# Patient Record
Sex: MALE | Race: WHITE | Employment: FULL TIME | URBAN - NONMETROPOLITAN AREA
[De-identification: names, ages, dates, MRNs, and addresses within clinical notes are randomized per-mention and may not be internally consistent; named-entity substitution may affect disease eponyms.]

---

## 2020-02-20 ENCOUNTER — HOSPITAL ENCOUNTER (EMERGENCY)
Age: 25
Discharge: HOME | End: 2020-02-21
Attending: EMERGENCY MEDICINE
Payer: COMMERCIAL

## 2020-02-20 VITALS
HEART RATE: 83 BPM | SYSTOLIC BLOOD PRESSURE: 146 MMHG | DIASTOLIC BLOOD PRESSURE: 88 MMHG | BODY MASS INDEX: 35.78 KG/M2 | TEMPERATURE: 97.8 F | WEIGHT: 270 LBS | HEIGHT: 73 IN

## 2020-02-20 PROCEDURE — 99282 EMERGENCY DEPT VISIT SF MDM: CPT

## 2020-02-21 PROCEDURE — 6370000000 HC RX 637 (ALT 250 FOR IP): Performed by: EMERGENCY MEDICINE

## 2020-02-21 RX ORDER — NEOMYCIN SULFATE, POLYMYXIN B SULFATE, HYDROCORTISONE 3.5; 10000; 1 MG/ML; [USP'U]/ML; MG/ML
2 SOLUTION/ DROPS AURICULAR (OTIC) ONCE
Status: COMPLETED | OUTPATIENT
Start: 2020-02-21 | End: 2020-02-21

## 2020-02-21 RX ADMIN — NEOMYCIN SULFATE, POLYMYXIN B SULFATE, HYDROCORTISONE 2 DROP: 3.5; 10000; 1 SOLUTION/ DROPS AURICULAR (OTIC) at 00:40

## 2020-02-21 NOTE — ED NOTES
Pt to ED with c/o bug in right ear. Pt states he can feel it moving inside ear and back is able to be seen when checked. Pt denies any pain or other complaints, just discomfort r/t bug in ear. Pt alert, VS updated and as charted. SO at bedside. MD placed Lidocaine in ear and this RN will irrigate.      Anabel Comment, RN  02/20/20 0336

## 2020-02-21 NOTE — ED PROVIDER NOTES
Emergency Department Attending Note    Osiris Dickerson MD    Date of ED VIsit: 2/20/2020    CHIEF COMPLAINT  Foreign Body in Ear (Pt reports he feels as if there is a light in his right ear. )      HISTORY OF PRESENT ILLNESS  Cuco Lucio is a 21 y.o. male  With Vital signs of BP (!) 146/88   Pulse 83   Temp 97.8 °F (36.6 °C) (Oral)   Ht 6' 1\" (1.854 m)   Wt 270 lb (122.5 kg)   BMI 35.62 kg/m²  who presents to the ED with a complaint of bug in his ear. Patient seen and evaluated in room 7. Patient comes in complaining of sensation of bug in his right ear. He was laying down apparently and bug must of crawled into his ear and now he feels it moving in there and that is what made him come in the emerge department for evaluation. She has no other complaints. No other complaints, modifying factors or associated symptoms. Patients Past medical history reviewed and listed below  Past Medical History:   Diagnosis Date    Diabetes mellitus ([facility] [address]     Pneumonia      History reviewed. No pertinent surgical history. I have reviewed the following from the nursing documentation. History reviewed. No pertinent family history.   Social History     Socioeconomic History    Marital status: Single     Spouse name: Not on file    Number of children: Not on file    Years of education: Not on file    Highest education level: Not on file   Occupational History    Not on file   Social Needs    Financial resource strain: Not on file    Food insecurity:     Worry: Not on file     Inability: Not on file    Transportation needs:     Medical: Not on file     Non-medical: Not on file   Tobacco Use    Smoking status: Never Smoker    Smokeless tobacco: Never Used   Substance and Sexual Activity    Alcohol use: No    Drug use: No    Sexual activity: Not on file   Lifestyle    Physical activity:     Days per week: Not on file     Minutes per session: Not on file    Stress: Not on file   Relationships    Social connections:     Talks on phone: Not on file     Gets together: Not on file     Attends Religion service: Not on file     Active member of club or organization: Not on file     Attends meetings of clubs or organizations: Not on file     Relationship status: Not on file    Intimate partner violence:     Fear of current or ex partner: Not on file     Emotionally abused: Not on file     Physically abused: Not on file     Forced sexual activity: Not on file   Other Topics Concern    Not on file   Social History Narrative    Not on file     No current facility-administered medications for this encounter. Current Outpatient Medications   Medication Sig Dispense Refill    Dulaglutide (TRULICITY SC) Inject into the skin x1 week.  metFORMIN (GLUCOPHAGE) 500 MG tablet Take 500 mg by mouth 2 times daily (with meals)       No Known Allergies    REVIEW OF SYSTEMS  10 systems reviewed, pertinent positives per HPI otherwise noted to be negative     PHYSICAL EXAM  BP (!) 146/88   Pulse 83   Temp 97.8 °F (36.6 °C) (Oral)   Ht 6' 1\" (1.854 m)   Wt 270 lb (122.5 kg)   BMI 35.62 kg/m²   GENERAL APPEARANCE: Awake and alert. Cooperative. In minimal distress. HEAD: Normocephalic. Atraumatic. EYES: PERRL. EOM's grossly intact. ENT: Mucous membranes are pink and moist.  Patient on examination did have a what looked like a cockroach stuck in his right external auditory canal.  It was way up in there at about the 3 o'clock position        ED COURSE/MDM    Patient underwent multiple rounds of irrigation which just moved bug around none of it had the bug come out and this was all after the administration of 1% lidocaine. We further instilled some tetracaine to numb the ear canal for the patient which was still difficult because they the area being so sensitive. I then attempted curettes as well as Q-tips and forceps with no LOC.   The paramedic recommended maybe we try to suction it out and we did uses a metal tip

## 2020-09-20 ENCOUNTER — HOSPITAL ENCOUNTER (INPATIENT)
Age: 25
LOS: 1 days | Discharge: HOME | DRG: 881 | End: 2020-09-21
Attending: EMERGENCY MEDICINE | Admitting: PSYCHIATRY & NEUROLOGY
Payer: OTHER GOVERNMENT

## 2020-09-20 PROBLEM — F32.A DEPRESSION: Status: ACTIVE | Noted: 2020-09-20

## 2020-09-20 LAB
A/G RATIO: 1.9 (ref 1.1–2.2)
ALBUMIN SERPL-MCNC: 4.9 G/DL (ref 3.4–5)
ALP BLD-CCNC: 70 U/L (ref 40–129)
ALT SERPL-CCNC: 103 U/L (ref 10–40)
AMPHETAMINE SCREEN, URINE: NORMAL
ANION GAP SERPL CALCULATED.3IONS-SCNC: 8 MMOL/L (ref 3–16)
AST SERPL-CCNC: 49 U/L (ref 15–37)
BARBITURATE SCREEN URINE: NORMAL
BASOPHILS ABSOLUTE: 0 K/UL (ref 0–0.2)
BASOPHILS RELATIVE PERCENT: 0.4 %
BENZODIAZEPINE SCREEN, URINE: NORMAL
BILIRUB SERPL-MCNC: 1.6 MG/DL (ref 0–1)
BUN BLDV-MCNC: 11 MG/DL (ref 7–20)
CALCIUM SERPL-MCNC: 9.8 MG/DL (ref 8.3–10.6)
CANNABINOID SCREEN URINE: NORMAL
CHLORIDE BLD-SCNC: 99 MMOL/L (ref 99–110)
CO2: 28 MMOL/L (ref 21–32)
COCAINE METABOLITE SCREEN URINE: NORMAL
CREAT SERPL-MCNC: 0.8 MG/DL (ref 0.9–1.3)
EOSINOPHILS ABSOLUTE: 0.2 K/UL (ref 0–0.6)
EOSINOPHILS RELATIVE PERCENT: 1.8 %
ETHANOL: NORMAL MG/DL (ref 0–0.08)
GFR AFRICAN AMERICAN: >60
GFR NON-AFRICAN AMERICAN: >60
GLOBULIN: 2.6 G/DL
GLUCOSE BLD-MCNC: 121 MG/DL (ref 70–99)
HCT VFR BLD CALC: 47.4 % (ref 40.5–52.5)
HEMOGLOBIN: 16.3 G/DL (ref 13.5–17.5)
LYMPHOCYTES ABSOLUTE: 2.7 K/UL (ref 1–5.1)
LYMPHOCYTES RELATIVE PERCENT: 27.7 %
Lab: NORMAL
MCH RBC QN AUTO: 31.7 PG (ref 26–34)
MCHC RBC AUTO-ENTMCNC: 34.4 G/DL (ref 31–36)
MCV RBC AUTO: 92.1 FL (ref 80–100)
METHADONE SCREEN, URINE: NORMAL
MONOCYTES ABSOLUTE: 0.6 K/UL (ref 0–1.3)
MONOCYTES RELATIVE PERCENT: 6.6 %
NEUTROPHILS ABSOLUTE: 6.1 K/UL (ref 1.7–7.7)
NEUTROPHILS RELATIVE PERCENT: 63.5 %
OPIATE SCREEN URINE: NORMAL
OXYCODONE URINE: NORMAL
PDW BLD-RTO: 12.7 % (ref 12.4–15.4)
PH UA: 5
PHENCYCLIDINE SCREEN URINE: NORMAL
PLATELET # BLD: 281 K/UL (ref 135–450)
PMV BLD AUTO: 7.8 FL (ref 5–10.5)
POTASSIUM SERPL-SCNC: 4.1 MMOL/L (ref 3.5–5.1)
PROPOXYPHENE SCREEN: NORMAL
RBC # BLD: 5.14 M/UL (ref 4.2–5.9)
SARS-COV-2, NAAT: NOT DETECTED
SODIUM BLD-SCNC: 135 MMOL/L (ref 136–145)
TOTAL PROTEIN: 7.5 G/DL (ref 6.4–8.2)
WBC # BLD: 9.6 K/UL (ref 4–11)

## 2020-09-20 PROCEDURE — 80053 COMPREHEN METABOLIC PANEL: CPT

## 2020-09-20 PROCEDURE — G0480 DRUG TEST DEF 1-7 CLASSES: HCPCS

## 2020-09-20 PROCEDURE — 85025 COMPLETE CBC W/AUTO DIFF WBC: CPT

## 2020-09-20 PROCEDURE — 80307 DRUG TEST PRSMV CHEM ANLYZR: CPT

## 2020-09-20 PROCEDURE — U0002 COVID-19 LAB TEST NON-CDC: HCPCS

## 2020-09-20 PROCEDURE — 1240000000 HC EMOTIONAL WELLNESS R&B

## 2020-09-20 PROCEDURE — 99285 EMERGENCY DEPT VISIT HI MDM: CPT

## 2020-09-20 RX ORDER — ACETAMINOPHEN 325 MG/1
650 TABLET ORAL EVERY 4 HOURS PRN
Status: DISCONTINUED | OUTPATIENT
Start: 2020-09-20 | End: 2020-09-21 | Stop reason: HOSPADM

## 2020-09-20 RX ORDER — TRAZODONE HYDROCHLORIDE 50 MG/1
25 TABLET ORAL NIGHTLY PRN
Status: DISCONTINUED | OUTPATIENT
Start: 2020-09-20 | End: 2020-09-21 | Stop reason: HOSPADM

## 2020-09-20 RX ORDER — LORAZEPAM 0.5 MG/1
0.5 TABLET ORAL EVERY 6 HOURS PRN
Status: DISCONTINUED | OUTPATIENT
Start: 2020-09-20 | End: 2020-09-21 | Stop reason: HOSPADM

## 2020-09-20 RX ORDER — LORAZEPAM 2 MG/ML
1 INJECTION INTRAMUSCULAR EVERY 6 HOURS PRN
Status: DISCONTINUED | OUTPATIENT
Start: 2020-09-20 | End: 2020-09-21 | Stop reason: HOSPADM

## 2020-09-20 RX ORDER — HALOPERIDOL 5 MG/ML
2 INJECTION INTRAMUSCULAR EVERY 6 HOURS PRN
Status: DISCONTINUED | OUTPATIENT
Start: 2020-09-20 | End: 2020-09-21 | Stop reason: HOSPADM

## 2020-09-20 RX ORDER — BENZTROPINE MESYLATE 1 MG/ML
1 INJECTION INTRAMUSCULAR; INTRAVENOUS 2 TIMES DAILY PRN
Status: DISCONTINUED | OUTPATIENT
Start: 2020-09-20 | End: 2020-09-21 | Stop reason: HOSPADM

## 2020-09-20 RX ORDER — HALOPERIDOL 1 MG/1
2 TABLET ORAL EVERY 6 HOURS PRN
Status: DISCONTINUED | OUTPATIENT
Start: 2020-09-20 | End: 2020-09-21 | Stop reason: HOSPADM

## 2020-09-20 RX ORDER — MAGNESIUM HYDROXIDE/ALUMINUM HYDROXICE/SIMETHICONE 120; 1200; 1200 MG/30ML; MG/30ML; MG/30ML
30 SUSPENSION ORAL EVERY 6 HOURS PRN
Status: DISCONTINUED | OUTPATIENT
Start: 2020-09-20 | End: 2020-09-21 | Stop reason: HOSPADM

## 2020-09-20 ASSESSMENT — LIFESTYLE VARIABLES: HISTORY_ALCOHOL_USE: NO

## 2020-09-20 ASSESSMENT — SLEEP AND FATIGUE QUESTIONNAIRES
DO YOU HAVE DIFFICULTY SLEEPING: NO
DO YOU USE A SLEEP AID: NO
AVERAGE NUMBER OF SLEEP HOURS: 7

## 2020-09-20 NOTE — ED NOTES
[facility]. Patient changed out into safety clothing. Patient oriented to [facility]. Will continue to monitor patient.      Mehnaz Rodrigues RN  09/20/20 7118

## 2020-09-20 NOTE — ED NOTES
Pt sitting in chairs in milieu - sister present - no signs or symptoms of distress noted - will continue to monitor     Adolfo Singleton RN  09/20/20 1950

## 2020-09-20 NOTE — ED NOTES
Collateral Contact:  Name:Bouchra [phone]  Relation to Patient: Wife  Last Contact with Patient: today  Concerns: Reports they have been arguing a lot and reports she had plans to move out and separate. Reports today they agreed she was going to move out soon with their 3year old and her 6 yr old son. Reports they sat down and talked then she went to the store and he started texting her. Reports she ignored him until she came home and could talk. Reports she came home and he was in bedroom laying down. Reports he got angry and punched the wall and was crying. Reports she asked him if he wanted her to call someone and he said the police. Reports she asked if he preferred her to call his family. She reports he then said he was going to burn the house down. She then started to contact the police and he made statement good call the police Im going to tell them to blow my brains out. Reports she then contacted the police who arrived and brought him here. Reports he had never made suicidal comments prior to about 4 weeks ago when they started arguing. Reports he did make one statement then about not wanting to live. Reports he then agreed to go to [place] [address] for an appointment however she does not know what happened with that. Reports he wanted her to go do therapy with him however she felt he needed to work on himself. She reports she is moving out today with the 2 children. Reports she has guns however will be taking them with her. Reports he does have a knife and a crossbow.         Gena Renae RN  09/20/20 9928
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
## 2020-09-20 NOTE — ED NOTES
Level of Care Disposition: Admit       Patient was seen by ED provider and [facility] staff. The case presented to psychiatric provider on-call Dr. Emily Dunn. Based on the ED evaluation and information presented to the provider by [facility] staff it was determined that inpatient hospitalization is the least restrictive environment for the patient at this time. The patient will be admitted to the inpatient unit. Admitting provider did not order suicide precautions based on pt not having active plan. RATIONALE FOR ADMISSION:   Patient has a mental illness: depression    Patient at imminent risk of danger to self as demonstrated by having a general plan for SI. Patient at imminent risk of danger toward others demonstrated by threatening to burn his house down. Patient is at imminent risk of violating their own rights or the rights of others demonstrated by threatening SI and they could benefit from psychiatric treatment.     Insurance Pre certification Authorization: Rosy Hathaway [facility]  09/20/20 2140

## 2020-09-20 NOTE — ED NOTES
Patient encouraged to give urine sample. No sample at this time.       Antonio Quezada, MISSY  09/20/20 8592

## 2020-09-21 VITALS
OXYGEN SATURATION: 96 % | SYSTOLIC BLOOD PRESSURE: 116 MMHG | HEIGHT: 69 IN | TEMPERATURE: 97.7 F | DIASTOLIC BLOOD PRESSURE: 76 MMHG | RESPIRATION RATE: 14 BRPM | HEART RATE: 74 BPM | WEIGHT: 285 LBS | BODY MASS INDEX: 42.21 KG/M2

## 2020-09-21 PROBLEM — F32.2 CURRENT SEVERE EPISODE OF MAJOR DEPRESSIVE DISORDER WITHOUT PSYCHOTIC FEATURES WITHOUT PRIOR EPISODE (HCC): Status: ACTIVE | Noted: 2020-09-20

## 2020-09-21 LAB
CHOLESTEROL, TOTAL: 254 MG/DL (ref 0–199)
EKG ATRIAL RATE: 73 BPM
EKG DIAGNOSIS: NORMAL
EKG P AXIS: 52 DEGREES
EKG P-R INTERVAL: 174 MS
EKG Q-T INTERVAL: 384 MS
EKG QRS DURATION: 112 MS
EKG QTC CALCULATION (BAZETT): 423 MS
EKG R AXIS: 54 DEGREES
EKG T AXIS: 55 DEGREES
EKG VENTRICULAR RATE: 73 BPM
ESTIMATED AVERAGE GLUCOSE: 142.7 MG/DL
HBA1C MFR BLD: 6.6 %
HDLC SERPL-MCNC: 32 MG/DL (ref 40–60)
LDL CHOLESTEROL CALCULATED: 192 MG/DL
TRIGL SERPL-MCNC: 149 MG/DL (ref 0–150)
VLDLC SERPL CALC-MCNC: 30 MG/DL

## 2020-09-21 PROCEDURE — 99221 1ST HOSP IP/OBS SF/LOW 40: CPT | Performed by: PHYSICIAN ASSISTANT

## 2020-09-21 PROCEDURE — 83036 HEMOGLOBIN GLYCOSYLATED A1C: CPT

## 2020-09-21 PROCEDURE — 93010 ELECTROCARDIOGRAM REPORT: CPT | Performed by: INTERNAL MEDICINE

## 2020-09-21 PROCEDURE — 5130000000 HC BRIDGE APPOINTMENT

## 2020-09-21 PROCEDURE — 80061 LIPID PANEL: CPT

## 2020-09-21 PROCEDURE — 99223 1ST HOSP IP/OBS HIGH 75: CPT | Performed by: PSYCHIATRY & NEUROLOGY

## 2020-09-21 PROCEDURE — 93005 ELECTROCARDIOGRAM TRACING: CPT | Performed by: PSYCHIATRY & NEUROLOGY

## 2020-09-21 PROCEDURE — 36415 COLL VENOUS BLD VENIPUNCTURE: CPT

## 2020-09-21 PROCEDURE — 6370000000 HC RX 637 (ALT 250 FOR IP): Performed by: PHYSICIAN ASSISTANT

## 2020-09-21 RX ORDER — ATORVASTATIN CALCIUM 40 MG/1
40 TABLET, FILM COATED ORAL DAILY
Status: DISCONTINUED | OUTPATIENT
Start: 2020-09-21 | End: 2020-09-21 | Stop reason: HOSPADM

## 2020-09-21 RX ORDER — ESCITALOPRAM OXALATE 10 MG/1
10 TABLET ORAL DAILY
Qty: 30 TABLET | Refills: 0 | Status: SHIPPED | OUTPATIENT
Start: 2020-09-21 | End: 2020-09-28 | Stop reason: HOSPADM

## 2020-09-21 RX ADMIN — ATORVASTATIN CALCIUM 40 MG: 40 TABLET, FILM COATED ORAL at 16:17

## 2020-09-21 RX ADMIN — METFORMIN HYDROCHLORIDE 500 MG: 500 TABLET ORAL at 16:17

## 2020-09-21 ASSESSMENT — LIFESTYLE VARIABLES: HISTORY_ALCOHOL_USE: NO

## 2020-09-21 ASSESSMENT — SLEEP AND FATIGUE QUESTIONNAIRES
DO YOU HAVE DIFFICULTY SLEEPING: NO
AVERAGE NUMBER OF SLEEP HOURS: 7
DO YOU USE A SLEEP AID: NO

## 2020-09-21 NOTE — PLAN OF CARE
Problem: Suicide risk  Goal: Provide patient with safe environment  Description: Provide patient with safe environment  Outcome: Ongoing     Problem: Depressive Behavior With or Without Suicide Precautions:  Goal: Able to verbalize acceptance of life and situations over which he or she has no control  Description: Able to verbalize acceptance of life and situations over which he or she has no control  Outcome: Ongoing  Goal: Able to verbalize and/or display a decrease in depressive symptoms  Description: Able to verbalize and/or display a decrease in depressive symptoms  Outcome: Ongoing  Goal: Ability to disclose and discuss suicidal ideas will improve  Description: Ability to disclose and discuss suicidal ideas will improve  Outcome: Ongoing  Goal: Able to verbalize support systems  Description: Able to verbalize support systems  Outcome: Ongoing  Goal: Absence of self-harm  Description: Absence of self-harm  Outcome: Ongoing    Patient is  interactive with staff. Patient denies SI/HI/AVH. Patient states they slept good last night. Patient states, \"I would like to be discharged today. I came in because I needed to get away, but now I'm good to go home. \" Patient compliant with medications.

## 2020-09-21 NOTE — FLOWSHEET NOTE
09/21/20 0934   Activities of Daily Living   Patient Requires assistance with daily self-care activities? No   Leisure Activity 1   3 Favorite Leisure Activities \"Fishing. \"   Frequency weekly   Last time this month   Barriers to participating    (None)   Leisure Activity 2   [address]  \"Going to the dirt track races with my kids. \"   Frequency  monthly   Last time  in the last 3 months   Barriers to participating    (None)   Leisure Activity 3   [address]  \"Going out to dinner, going out to socialize. \"   Frequency  weekly   Last time  this week   Barriers to participating    (None)   Social   Patient reports spending the majority of their free time with a group   Patient verbalizes a preference for spending free time with a group   Patients perception of support system healthy/strong   Patients perception of barriers to socializing with others include(s) no perceived barriers   Social Details \"I have a counselor, Mariah Barney, at [facility]. I've talked to my doctor, Torres Cooper, out of [address] [facility] [address]. My parents, my sister, I have plenty of support. My mom lives just around the corner. \"   Beliefs & Coping   Has difficulty dealing with feelings   Yes   Internalizes feelings/Keeps feelings in No   Externalizes feelings through aggressiveness or poor temper control  No   Feels uncomfortable around others  No   Has difficulty talking to others  No   Depends on others for direction or decisions Yes   Difficulty dealing with anger of others  No   Difficulty dealing with own anger  Yes   Difficulty managing stress Yes   Frequently has difficulty with relationships  Yes   which,who,where in family   Has recently perceived/experienced loss, disappointment, humiliation or failure  Yes   General perception about self ambivalent   Attitude about abilities occasionally fails   Locus of Control  sometimes   Belief about recovery Recovery is possible   Patient Identified Strengths  \"A lot more fishing and peace and quiet and seeing my kids and going to work and doing something. \"   Patient Identified Limitations  \"My confidence is down. I don't feel like I can go out and socialize with people because I feel like a mistake. Like I'm not worth it, but then I move through it. \"   Perception of most stressful event prior to hospitalization \"Doing everything my wife asks and not getting appreciated for it and nothing changed. I've been changing who I am and changing how I go about things and getting nothing for it. We're currently , but I need to get out of here to figure out what I'm doing. I want to be with my mom and dad and figure out what's going on. \"   Perception of changes needed \"Not sitting around in my thoughts. Getting going and getting help. I don't feel comfortable here. I'd rather be wtih my mom and dad and my sisters. I took my doctor's advice and he said if I need help, to go to the hospital. Now I know I can come here and it's a safe place if I need help, but I'm ready to go home and do what I need to do. \"   Strengths and Limitations   Strengths Independent in basic self-care activities; Positive leisure interests; Positive support network; Motivated for change   Limitations Difficulty problem solving/relies on others to help solve problems; Difficult relationships / poor social skills; Tendency to isolate self     Therapist met with Hanh Rosen and completed Leisure Assessment.
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
## 2020-09-21 NOTE — H&P
Hospital Medicine History & Physical      PCP: MADDIE Kumar CNP    Date of Admission: 9/20/2020    Date of Service: Pt seen/examined on 9/21/2020      Chief Complaint:    Chief Complaint   Patient presents with    Suicidal     pt has had thoughts of shooting himself burnign the house down. pt states that he doesnt have access to guns at this time. History Of Present Illness: The patient is a 31 y.o. male with DM2, HLD who presented to [facility] for SI. Patient was seen and evaluated in the ED by the ED medical provider, patient was medically cleared for admission to [facility] at [facility]. This note serves as an admission medical H&P. Tobacco use: denies   ETOH use: denies   Illicit drug use: denies     Patient denies any medical complaints     Past Medical History:        Diagnosis Date    Diabetes mellitus ([address]     Pneumonia        Past Surgical History:        Procedure Laterality Date    ANKLE SURGERY      FRACTURE SURGERY         Medications Prior to Admission:    Prior to Admission medications    Medication Sig Start Date End Date Taking? Authorizing Provider   Atorvastatin Calcium (LIPITOR PO) Take 40 mg by mouth daily    Yes Historical Provider, MD   Dulaglutide (TRULICITY SC) Inject into the skin x1 week. Yes Historical Provider, MD   metFORMIN (GLUCOPHAGE) 500 MG tablet Take 500 mg by mouth daily    Yes Historical Provider, MD       Allergies:  Patient has no known allergies. Social History:  The patient currently lives at home by himself. He is unemployed     TOBACCO:   reports that he has never smoked. He has never used smokeless tobacco.  ETOH:   reports no history of alcohol use. Family History:   Positive as follows:    History reviewed. No pertinent family history.     REVIEW OF SYSTEMS:       Constitutional: Negative for fever   HENT: Negative for sore throat   Eyes: Negative for redness   Respiratory: Negative  for dyspnea, cough   Cardiovascular: Negative for chest pain Gastrointestinal: Negative for vomiting, diarrhea   Genitourinary: Negative for hematuria   Musculoskeletal: Negative for arthralgias   Skin: Negative for rash   Neurological: Negative for syncope    Hematological: Negative for easy bruising/bleeding   Psychiatric/Behavorial: Per psychiatry team evaluation     PHYSICAL EXAM:    /76   Pulse 74   Temp 97.7 °F (36.5 °C) (Oral)   Resp 14   Ht 5' 9\" (1.753 m)   Wt 285 lb (129.3 kg)   SpO2 96%   BMI 42.09 kg/m²     Gen: No distress. Alert. Eyes: PERRL. No sclera icterus. No conjunctival injection. ENT: No discharge. Pharynx clear. Neck: No JVD. No Carotid Bruit. Trachea midline. Resp: No accessory muscle use. No crackles. No wheezes. No rhonchi. CV: Regular rate. Regular rhythm. No murmur. No rub. No edema. GI: Non-tender. Non-distended. Normal bowel sounds. Skin: Warm and dry. No nodule on exposed extremities. No rash on exposed extremities. M/S: No cyanosis. No joint deformity. No clubbing. Neuro: Awake. No focal neurologic deficit on exam.  Cranial nerves II through XII intact. Patient is able to ambulate without difficulty. Psych: Per psychiatry team evaluation     CBC:   Recent Labs     09/20/20  1800   WBC 9.6   HGB 16.3   HCT 47.4   MCV 92.1        BMP:   Recent Labs     09/20/20  1800   *   K 4.1   CL 99   CO2 28   BUN 11   CREATININE 0.8*     LIVER PROFILE:   Recent Labs     09/20/20  1800   AST 49*   *   BILITOT 1.6*   ALKPHOS 70     PT/INR: No results for input(s): PROTIME, INR in the last 72 hours. APTT: No results for input(s): APTT in the last 72 hours.   UA:  Recent Labs     09/20/20  1851   PHUR 5.0          U/A:    Lab Results   Component Value Date    COLORU Yellow 12/31/2015    CLARITYU Clear 12/31/2015    SPECGRAV 1.010 12/31/2015    LEUKOCYTESUR Negative 12/31/2015    BLOODU Negative 12/31/2015    GLUCOSEU Negative 12/31/2015       ASSESSMENT/PLAN:  #Depression   - per psychiatry team    #DM2  - cont metformin  - he takes trulicity once weekly at home  - he reports good BG control    #HLD  - cont statin    #Transaminitis  - he states this is not new and his PCP is aware, he will f/u with PCP for further work up    #Morbid Obesity  - Body mass index is 42.09 kg/m². - Complicating assessment and treatment. Placing patient at risk for multiple co-morbidities as well as early death and contributing to the patient's presentation.    - weight loss would help     Xiomara Vasquez PA-C  9/21/2020 3:47 PM

## 2020-09-21 NOTE — BH NOTE
Patient's father contacted about patient being discharged to his house. Father okay with son staying at his place. Father stated, \"We have weapons at the house, but we are going to get rid of them. \" Father believes his son will be safe at his house. He stated, \"He will always be with someone.  He won't leave him alone\"

## 2020-09-21 NOTE — BH NOTE
[facility]  Discharge Note    Pt discharged with followings belongings:   Dentures: None  Vision - Corrective Lenses: None  Hearing Aid: None  Jewelry: None  Body Piercings Removed: No  Clothing: Footwear, Sweater, Pants  Were All Patient Medications Collected?: Not Applicable  Other Valuables: Money (Comment)(Patient has 83.00 dollars in safe in medication room.)   Valuables sent home with patient. Valuables retrieved from safe, Security envelope number:  yes and returned to patient. Patient education on aftercare instructions: yes  Patient verbalize understanding of AVS:  yes. Status EXAM upon discharge:  Status and Exam  Normal: No  Facial Expression: Flat  Affect: Constricted  Level of Consciousness: Alert  Mood:Normal: No  Mood: Depressed, Anxious  Motor Activity:Normal: Yes  Interview Behavior: Cooperative  Preception: [place] to Person, Lucyann Emerald to Time, [place] to Place, [place] to Situation  Attention:Normal: Yes  Attention: Distractible  Thought Processes: Other(See comment)(Linear)  Thought Content:Normal: Yes  Hallucinations: None(Pt Denies)  Delusions: No  Memory:Normal: Yes  Insight and Judgment: No (improving)  Insight and Judgment: Poor Judgment, Poor Insight (improving)  Present Suicidal Ideation: No  Present Homicidal Ideation: No      Metabolic Screening:    Lab Results   Component Value Date    LABA1C 6.6 09/21/2020       Lab Results   Component Value Date    CHOL 254 (H) 09/21/2020     Lab Results   Component Value Date    TRIG 149 09/21/2020     Lab Results   Component Value Date    HDL 32 (L) 09/21/2020     No components found for: [facility]  Lab Results   Component Value Date    LABVLDL 30 09/21/2020     Bridge Appointment completed: Reviewed Discharge Instructions with patient. Patient verbalizes understanding and agreement with the discharge plan using the teachback method.      Referral for Outpatient Tobacco Cessation Counseling, upon discharge (steffi X if applicable and completed):    ( )  Hospital staff assisted patient to call Quit Line or faxed referral                                   during hospitalization                  ( )  Recognizing danger situations (included triggers and roadblocks), if not completed on admission                    ( )  Coping skills (new ways to manage stress, exercise, relaxation techniques, changing routine, distraction), if not completed on admission                                                           (x )  Basic information about quitting (benefits of quitting, techniques in how to quit, available resources, if not completed on admission  ( ) [facility]   ( ) Patient refused referral  (x ) Patient refused counseling  ( ) Patient refused smoking cessation medication upon discharge    Vaccinations (steffi X if applicable and completed):  ( ) Patient states already received influenza vaccine elsewhere  ( ) Patient received influenza vaccine during this hospitalization  ( x) Patient refused influenza vaccine at this time    Odalis Maynard RN

## 2020-09-21 NOTE — BH NOTE
..   `[facility]  Admission Note     Admission Type:   Admission Type:  Involuntary    Reason for admission:  Reason for Admission: Suicdae Attempt    PATIENT STRENGTHS:  Strengths: Connection to output provider, Medication Compliance, Motivated, No significant Physical Illness, Positive Support, Social Skills    Patient Strengths and Limitations:       Addictive Behavior:   Addictive Behavior  In the past 3 months, have you felt or has someone told you that you have a problem with:  : None  Do you have a history of Chemical Use?: No  Do you have a history of Alcohol Use?: No  Do you have a history of Street Drug Abuse?: No  Histroy of Prescripton Drug Abuse?: No    Medical Problems:   Past Medical History:   Diagnosis Date    Diabetes mellitus ([facility] [address]     Pneumonia        Status EXAM:  Status and Exam  Normal: No  Facial Expression: Avoids Gaze  Affect: Blunt  Level of Consciousness: Alert  Mood:Normal: Yes  Motor Activity:Normal: Yes  Interview Behavior: Cooperative, Evasive  Preception: [place] to Person, Mena Guillermo to Time, [place] to Place  Attention:Normal: No  Attention: Distractible  Thought Processes: Circumstantial  Thought Content:Normal: Yes  Hallucinations: None(Patient denies)  Delusions: No  Memory:Normal: Yes  Insight and Judgment: No  Insight and Judgment: Poor Judgment, Poor Insight  Present Suicidal Ideation: No  Present Homicidal Ideation: No    Tobacco Screening:  Practical Counseling, on admission, steffi X, if applicable and completed (first 3 are required if patient doesn't refuse):            ( )  Recognizing danger situations (included triggers and roadblocks)                    ( )  Coping skills (new ways to manage stress, exercise, relaxation techniques, changing routine, distraction)                                                           ( )  Basic information about quitting (benefits of quitting, techniques in how to quit, available resources  ( ) Referral for counseling
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
## 2020-09-21 NOTE — BH NOTE
Patient denied being suicidal upon admission. Patient contracted for safety with this writer. Dr Morris Liter aware. Note new order to discontinue continous suicdal precautions.

## 2020-09-28 ENCOUNTER — HOSPITAL ENCOUNTER (OUTPATIENT)
Dept: PSYCHIATRY | Age: 25
Setting detail: THERAPIES SERIES
Discharge: HOME | End: 2020-09-28

## 2020-09-28 RX ORDER — FLUOXETINE 10 MG/1
10 CAPSULE ORAL DAILY
Qty: 14 CAPSULE | Refills: 0 | Status: SHIPPED | OUTPATIENT
Start: 2020-09-28 | End: 2020-10-05 | Stop reason: SDUPTHER
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
## 2020-10-05 ENCOUNTER — HOSPITAL ENCOUNTER (OUTPATIENT)
Dept: PSYCHIATRY | Age: 25
Setting detail: THERAPIES SERIES
Discharge: HOME | End: 2020-10-05

## 2020-10-05 PROCEDURE — 99215 OFFICE O/P EST HI 40 MIN: CPT | Performed by: PSYCHIATRY & NEUROLOGY

## 2020-10-05 RX ORDER — FLUOXETINE HYDROCHLORIDE 20 MG/1
20 CAPSULE ORAL DAILY
Qty: 30 CAPSULE | Refills: 0 | Status: SHIPPED | OUTPATIENT
Start: 2020-10-05 | End: 2021-08-29

## 2020-10-05 NOTE — PROGRESS NOTES
psychotic features. 2.  Diabetes mellitus. 3.  Hypercholesterolemia. 4.  Chronic pain. 5.  Elevated LFTs. PLAN:  1.  9/28/2020 -  discontinued Lexapro, start Prozac 10 mg daily for treatment of  depression. Risks, benefits and side effects discussed with the patient  at length. In agreement. 10/5/2020 - increase Prozac to 20mg daily. 2.  good sleep hygiene and daily exercise. 3.  Safety plan - calling 911, if he should develop thoughts of  self-harm or suicide. 4.  Follow up with me in 2 weeks for further treatment. Sooner if  Needed.     Eva Novak MD, [facility] [place],[address], JADA

## 2020-10-19 ENCOUNTER — HOSPITAL ENCOUNTER (OUTPATIENT)
Dept: PSYCHIATRY | Age: 25
Setting detail: THERAPIES SERIES
Discharge: HOME | End: 2020-10-19

## 2020-10-19 VITALS
DIASTOLIC BLOOD PRESSURE: 75 MMHG | SYSTOLIC BLOOD PRESSURE: 135 MMHG | RESPIRATION RATE: 14 BRPM | TEMPERATURE: 97.6 F | HEART RATE: 83 BPM

## 2020-10-19 PROCEDURE — 99215 OFFICE O/P EST HI 40 MIN: CPT | Performed by: PSYCHIATRY & NEUROLOGY

## 2020-10-20 NOTE — PROGRESS NOTES
Department of Psychiatry  Progress Note    Patient's chart was reviewed. Discussed with treatment team. Met with patient. SUBJECTIVE:    Continues to make gains on Prozac. \"Things are moving in the right direction\"    Tolerating increased dose well and without side effects. 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 Tess better with stress; is less irritable and sleeping better. Scored 12 on PHQ9 today - 1 on item 9. Encouraged him to talk with his PCP about elevated LFTs. ROS:   Patient has new complaints: no  Sleeping adequately:  Yes   Appetite adequate: Yes    Does not endorse or describe head aches, SOB, cough, sore throat, chills, chest pain, abdominal pain, neuro problems, bleeding problems, or skin problems. Was moving all four extremities and speaking without difficulty. OBJECTIVE:  Vitals:    10/19/20 1045   BP: 135/75   Pulse: 83   Resp: 14   Temp: 97.6 °F (36.4 °C)     Gait: normal    Mental Status Examination:    Appearance: fair grooming and hygiene  Behavior/Attitude toward examiner:  cooperative, attentive and fair eye contact  Speech: Normal rate, volume, amount  Mood:  \"better\"  Affect:  blunted     Thought processes:  Goal directed, linear, no ALANA or gross disorganization  Thought Content: no SI, no HI, no delusions voiced, no obsessions  Perceptions: no AVH  Attention: attention span and concentration were intact to interview   Abstraction: intact  Cognition:  Alert and oriented to person, place, time, and situation, recall intact  Insight: fair  Judgment: fair    Medication:  Prozac 20mg daily. FORMULATION:  This is a domiciled, recently , and recently  unemployed 79-year-old who presents for psychiatric evaluation after  brief inpatient stay for depression and suicidality. The patient meets  criteria for major depressive episode, severe without psychotic  features.   He also may meet criteria for anxiety disorder and/or PTSD. He was started on Lexapro, but could not tolerate it and so was switched to Lexapro. DIAGNOSES:  1.  Major depressive episode, severe, without psychotic features. 2.  Diabetes mellitus. 3.  Hypercholesterolemia. 4.  Chronic pain. 5.  Elevated LFTs. PLAN:  1.  9/28/2020 -  discontinued Lexapro, start Prozac 10 mg daily for treatment of  depression. Risks, benefits and side effects discussed with the patient  at length. In agreement. 10/5/2020 - increased Prozac to 20mg daily. 2.  good sleep hygiene and daily exercise. 3.  Safety plan - calling 911, if he should develop thoughts of  self-harm or suicide. 4.  Follow up with me in 2 weeks for further treatment. Pt to call to scheduled the appointment as soon as he knows school schedule. Sooner if needed.     Iva Clements MD, [facility] [place],[address], JADA

## 2021-05-12 ENCOUNTER — HOSPITAL ENCOUNTER (OUTPATIENT)
Age: 26
Discharge: HOME | End: 2021-05-12
Payer: COMMERCIAL

## 2021-05-12 ENCOUNTER — HOSPITAL ENCOUNTER (OUTPATIENT)
Dept: GENERAL RADIOLOGY | Age: 26
Discharge: HOME | End: 2021-05-12
Payer: COMMERCIAL

## 2021-05-12 DIAGNOSIS — R52 PAIN: ICD-10-CM

## 2021-05-12 PROCEDURE — 73610 X-RAY EXAM OF ANKLE: CPT

## 2021-08-29 ENCOUNTER — HOSPITAL ENCOUNTER (EMERGENCY)
Age: 26
Discharge: HOME | End: 2021-08-29
Payer: COMMERCIAL

## 2021-08-29 VITALS
OXYGEN SATURATION: 97 % | WEIGHT: 275 LBS | BODY MASS INDEX: 40.73 KG/M2 | HEART RATE: 84 BPM | RESPIRATION RATE: 14 BRPM | DIASTOLIC BLOOD PRESSURE: 92 MMHG | TEMPERATURE: 98 F | SYSTOLIC BLOOD PRESSURE: 145 MMHG | HEIGHT: 69 IN

## 2021-08-29 DIAGNOSIS — L03.115 CELLULITIS OF RIGHT LOWER EXTREMITY: Primary | ICD-10-CM

## 2021-08-29 PROCEDURE — 6370000000 HC RX 637 (ALT 250 FOR IP): Performed by: NURSE PRACTITIONER

## 2021-08-29 PROCEDURE — 99285 EMERGENCY DEPT VISIT HI MDM: CPT

## 2021-08-29 RX ORDER — DOXYCYCLINE HYCLATE 100 MG
100 TABLET ORAL 2 TIMES DAILY
Qty: 14 TABLET | Refills: 0 | Status: SHIPPED | OUTPATIENT
Start: 2021-08-29 | End: 2021-09-05

## 2021-08-29 RX ORDER — CYCLOBENZAPRINE HCL 5 MG
TABLET ORAL
COMMUNITY
End: 2021-08-29

## 2021-08-29 RX ORDER — GABAPENTIN 300 MG/1
300 CAPSULE ORAL 3 TIMES DAILY
COMMUNITY

## 2021-08-29 RX ORDER — DOXYCYCLINE HYCLATE 100 MG
100 TABLET ORAL ONCE
Status: COMPLETED | OUTPATIENT
Start: 2021-08-29 | End: 2021-08-29

## 2021-08-29 RX ORDER — METHOCARBAMOL 500 MG/1
500 TABLET, FILM COATED ORAL 4 TIMES DAILY
COMMUNITY

## 2021-08-29 RX ADMIN — DOXYCYCLINE HYCLATE 100 MG: 100 TABLET, COATED ORAL at 19:41

## 2021-08-29 ASSESSMENT — PAIN SCALES - GENERAL: PAINLEVEL_OUTOF10: 6

## 2021-08-29 NOTE — ED NOTES
Reviewed discharge paperwork including prescriptions and follow up appointments. Reviewed basic care for cellulitis and swelling,  Pt verbalized understanding.       Morteza Solis RN  08/29/21 1944

## 2021-09-01 NOTE — ED PROVIDER NOTES
[address] [place] [address]  ED  EMERGENCY DEPARTMENT ENCOUNTER      This patient was not seen and evaluated by the attending physician. Pt Name: Yohannes Chavez  MRN: [id number]  [place]ffrederick 1995  Date of evaluation: 8/29/2021  Provider: MADDIE Thornton - CNP-C  PCP: Irina Gaffney MD      History provided by the patient. CHIEFCOMPLAINT:     Chief Complaint   Patient presents with    Leg Pain     pt has no idea when redness started on right lower extremity, wife and wifes cousin suggested pt come and be seen for cellulitis       HISTORY OF PRESENT ILLNESS:      Yohannes Chavez is a 31 y.o. male who presents to [address] [place] [address]  ED with complaints of leg pain. Patient states that he had some redness on his right lower extremity, states that his wife and her cousin made him come in to be checked out for cellulitis. Patient states that he always has ankle pain he does not think it is any different, he states that it is red but it could have always been red he says he never really looked at it. He denies any fevers he has no other injuries or complaints. He is here for further evaluation. LOCATION:right ankle  QUALITY:ache  SEVERITY:6  DURATION:chronic  MODIFYING FACTORS:none noted    Nursing Notes were reviewed     REVIEW OF SYSTEMS:     Review of Systems  All systems, a total of 10, are reviewed and negative except for those that were just noted in history present illness.         PAST MEDICAL HISTORY:     Past Medical History:   Diagnosis Date    Diabetes mellitus ([facility] [address]     Pneumonia          SURGICAL HISTORY:      Past Surgical History:   Procedure Laterality Date    ANKLE SURGERY      FRACTURE SURGERY           CURRENT MEDICATIONS:       Discharge Medication List as of 8/29/2021  7:27 PM      CONTINUE these medications which have NOT CHANGED    Details   methocarbamol (ROBAXIN) 500 MG tablet Take 500 mg by mouth 4 times dailyHistorical Med      gabapentin (NEURONTIN) 300 MG capsule Take 300 mg by mouth 3 times daily. Historical Med      FLUoxetine (PROZAC) 20 MG capsule Take 1 capsule by mouth daily, Disp-30 capsule, R-0Normal      Dulaglutide (TRULICITY SC) Inject into the skin x1 week. Historical Med      metFORMIN (GLUCOPHAGE) 500 MG tablet Take 500 mg by mouth daily Historical Med               ALLERGIES:    Patient has no known allergies. FAMILY HISTORY:     History reviewed. No pertinent family history. SOCIAL HISTORY:     Social History     Socioeconomic History    Marital status:      Spouse name: Be Bergeron Number of children: 2    Years of education: 15    Highest education level: None   Occupational History    None   Tobacco Use    Smoking status: Never Smoker    Smokeless tobacco: Never Used   Vaping Use    Vaping Use: Never used   Substance and Sexual Activity    Alcohol use: No    Drug use: No    Sexual activity: Yes     Partners: Female   Other Topics Concern    None   Social History Narrative    None     Social Determinants of Health     Financial Resource Strain:     Difficulty of Paying Living Expenses:    Food Insecurity:     Worried About Running Out of Food in the Last Year:     Ran Out of Food in the Last Year:    Transportation Needs:     Lack of Transportation (Medical):      Lack of Transportation (Non-Medical):    Physical Activity:     Days of Exercise per Week:     Minutes of Exercise per Session:    Stress:     Feeling of Stress :    Social Connections:     Frequency of Communication with Friends and Family:     Frequency of Social Gatherings with Friends and Family:     Attends Mandaeism Services:     Active Member of Clubs or Organizations:     Attends Club or Organization Meetings:     Marital Status:    Intimate Partner Violence:     Fear of Current or Ex-Partner:     Emotionally Abused:     Physically Abused:     Sexually Abused:        SCREENINGS:    [place] Coma Scale  Eye Opening: Spontaneous  Best Verbal Response: Oriented  Best Motor Response: Obeys commands  [place] Coma Scale Score: 15        PHYSICAL EXAM:       ED Triage Vitals   BP Temp Temp Source Pulse Resp SpO2 Height Weight   08/29/21 1732 08/29/21 1729 08/29/21 1729 08/29/21 1729 08/29/21 1729 08/29/21 1729 08/29/21 1855 08/29/21 1729   133/88 98.2 °F (36.8 °C) Oral 87 20 97 % 5' 9\" (1.753 m) 275 lb (124.7 kg)       Physical Exam    CONSTITUTIONAL: Awake and alert. Cooperative. Well-developed. Well-nourished. Vitals:    08/29/21 1729 08/29/21 1732 08/29/21 1855 08/29/21 1942   BP:  133/88 (!) 147/84 (!) 145/92   Pulse: 87  76 84   Resp: 20  18 14   Temp: 98.2 °F (36.8 °C)  98.7 °F (37.1 °C) 98 °F (36.7 °C)   TempSrc: Oral   Oral   SpO2: 97%  98% 97%   Weight: 275 lb (124.7 kg)  275 lb (124.7 kg)    Height:   5' 9\" (1.753 m)      HENT: Normocephalic. Atraumatic. External ears normal, without discharge. Nonasal discharge. Mucous membranes moist.  EYES: Conjunctiva non-injected, no lid abnormalities noted. No scleral icterus. EOM's grossly intact. Anterior chambers clear. NECK: Supple. Normal ROM. No meningismus. No thyroid tenderness or swelling noted. CARDIOVASCULAR: no tachycardia per vital signs. PULMONARY/CHEST WALL: Effort normal. No tachypnea. No audible adventitious breath sounds. ABDOMEN: No obvious abdominal distention, no obvious hernias. Back: Spine is midline. No obvious trauma or outward signs of cauda equina  /ANORECTAL: Not assessed  MUSKULOSKELETAL: Normal ROM. No acute deformities. No edema. SKIN: Warm and dry. Mild erythema noted to the right ankle, it is tender to touch with no swelling. NEUROLOGICAL:  GCS 15. No obvious focal neurological deficits. PSYCHIATRIC: Normal affect, normal insight and judgement. Alert and oriented x 3. DIAGNOSTIC RESULTS:     LABS:    No results found for this visit on 08/29/21. RADIOLOGY:  All x-ray studies are viewed/reviewed by me.   Formal interpretations per the radiologist are as follows: No orders to display           EKG:  See EKG interpretation by an attending physician. PROCEDURES:   N/A    CRITICAL CARE TIME:   N/A    CONSULTS:  None      EMERGENCY DEPARTMENT COURSE andDIFFERENTIAL DIAGNOSIS/MDM:   Vitals:    Vitals:    08/29/21 1729 08/29/21 1732 08/29/21 1855 08/29/21 1942   BP:  133/88 (!) 147/84 (!) 145/92   Pulse: 87  76 84   Resp: 20  18 14   Temp: 98.2 °F (36.8 °C)  98.7 °F (37.1 °C) 98 °F (36.7 °C)   TempSrc: Oral   Oral   SpO2: 97%  98% 97%   Weight: 275 lb (124.7 kg)  275 lb (124.7 kg)    Height:   5' 9\" (1.753 m)        Patient wasgiven the following medications:  Medications   doxycycline hyclate (VIBRA-TABS) tablet 100 mg (100 mg Oral Given 8/29/21 1941)         Patient was evaluated independently by myself with the attending physician available for consultation. Patient presented to the emergency room today with complaints of possible cellulitis. Patient assessment did show some erythema, patient is very poor historian about it, I did go ahead and cover him with antibiotics, he said no fever, vital signs stable. He was discharged home good condition bradycardia return to the ED for new or worsening symptoms. Patient laboratory studies, radiographic imaging, and assessment were all discussed with the patient and/orpatient family. There was shared decision-making between myself as well as the patient and/or their surrogate and we are all in agreement with discharge home. There was an opportunity for questions and all questions were answered tothe best of my ability and to the satisfaction of the patient and/or patient family. FINAL IMPRESSION:      1. Cellulitis of right lower extremity          DISPOSITION/PLAN:   DISPOSITION Decision To Discharge      PATIENT REFERRED TO:  Laney Foreman MD  [address] [place] [address] [phone]    Call   For follow up      DISCHARGE MEDICATIONS:  Discharge Medication List as of 8/29/2021  7:27 PM      START taking these medications    Details   doxycycline hyclate (VIBRA-TABS) 100 MG tablet Take 1 tablet by mouth 2 times daily for 7 days, Disp-14 tablet, R-0Normal                        (Please note thatportions of this note were completed with a voice recognition program.  Efforts were made to edit the dictations, but occasionally words are mis-transcribed.)    MADDIE Barrios CNP-C (electronicallysigned)      MADDIE Barrios CNP  09/01/21 1500

## 2021-09-30 ENCOUNTER — HOSPITAL ENCOUNTER (OUTPATIENT)
Dept: GENERAL RADIOLOGY | Age: 26
Discharge: HOME | End: 2021-09-30

## 2021-09-30 ENCOUNTER — HOSPITAL ENCOUNTER (OUTPATIENT)
Age: 26
Discharge: HOME | End: 2021-09-30

## 2021-09-30 DIAGNOSIS — R52 PAIN: ICD-10-CM

## 2021-09-30 PROCEDURE — 73610 X-RAY EXAM OF ANKLE: CPT

## 2022-05-28 NOTE — PRE-CERTIFICATION NOTE
Pt does not have insurance at this time. No pre-certification needed.
Patient baseline mental status/Awake

## 2022-08-05 ENCOUNTER — HOSPITAL ENCOUNTER (OUTPATIENT)
Dept: GENERAL RADIOLOGY | Age: 27
Discharge: HOME | End: 2022-08-05
Payer: COMMERCIAL

## 2022-08-05 ENCOUNTER — HOSPITAL ENCOUNTER (OUTPATIENT)
Age: 27
Discharge: HOME | End: 2022-08-05
Payer: COMMERCIAL

## 2022-08-05 DIAGNOSIS — R52 PAIN: ICD-10-CM

## 2022-08-05 PROCEDURE — 72100 X-RAY EXAM L-S SPINE 2/3 VWS: CPT

## 2022-11-24 ENCOUNTER — APPOINTMENT (OUTPATIENT)
Dept: GENERAL RADIOLOGY | Age: 27
End: 2022-11-24

## 2022-11-24 ENCOUNTER — HOSPITAL ENCOUNTER (EMERGENCY)
Age: 27
Discharge: HOME | End: 2022-11-24

## 2022-11-24 VITALS
RESPIRATION RATE: 16 BRPM | TEMPERATURE: 98.5 F | DIASTOLIC BLOOD PRESSURE: 94 MMHG | HEIGHT: 69 IN | SYSTOLIC BLOOD PRESSURE: 132 MMHG | BODY MASS INDEX: 41.47 KG/M2 | WEIGHT: 280 LBS | OXYGEN SATURATION: 98 % | HEART RATE: 84 BPM

## 2022-11-24 DIAGNOSIS — R07.9 CHEST PAIN, UNSPECIFIED TYPE: Primary | ICD-10-CM

## 2022-11-24 DIAGNOSIS — R74.01 TRANSAMINITIS: ICD-10-CM

## 2022-11-24 DIAGNOSIS — E11.9 TYPE 2 DIABETES MELLITUS WITHOUT COMPLICATION, WITHOUT LONG-TERM CURRENT USE OF INSULIN (HCC): ICD-10-CM

## 2022-11-24 LAB
A/G RATIO: 1.7 (ref 1.1–2.2)
ALBUMIN SERPL-MCNC: 4.3 G/DL (ref 3.4–5)
ALP BLD-CCNC: 81 U/L (ref 40–129)
ALT SERPL-CCNC: 95 U/L (ref 10–40)
ANION GAP SERPL CALCULATED.3IONS-SCNC: 9 MMOL/L (ref 3–16)
AST SERPL-CCNC: 46 U/L (ref 15–37)
BASOPHILS ABSOLUTE: 0.1 K/UL (ref 0–0.2)
BASOPHILS RELATIVE PERCENT: 0.6 %
BILIRUB SERPL-MCNC: 1.2 MG/DL (ref 0–1)
BUN BLDV-MCNC: 16 MG/DL (ref 7–20)
CALCIUM SERPL-MCNC: 9.3 MG/DL (ref 8.3–10.6)
CHLORIDE BLD-SCNC: 97 MMOL/L (ref 99–110)
CO2: 27 MMOL/L (ref 21–32)
CREAT SERPL-MCNC: 0.8 MG/DL (ref 0.9–1.3)
EOSINOPHILS ABSOLUTE: 0.1 K/UL (ref 0–0.6)
EOSINOPHILS RELATIVE PERCENT: 1.6 %
GFR SERPL CREATININE-BSD FRML MDRD: >60 ML/MIN/{1.73_M2}
GLUCOSE BLD-MCNC: 237 MG/DL (ref 70–99)
HCT VFR BLD CALC: 45.4 % (ref 40.5–52.5)
HEMOGLOBIN: 15.2 G/DL (ref 13.5–17.5)
LYMPHOCYTES ABSOLUTE: 3.1 K/UL (ref 1–5.1)
LYMPHOCYTES RELATIVE PERCENT: 37.5 %
MCH RBC QN AUTO: 30.3 PG (ref 26–34)
MCHC RBC AUTO-ENTMCNC: 33.4 G/DL (ref 31–36)
MCV RBC AUTO: 90.6 FL (ref 80–100)
MONOCYTES ABSOLUTE: 0.6 K/UL (ref 0–1.3)
MONOCYTES RELATIVE PERCENT: 7.1 %
NEUTROPHILS ABSOLUTE: 4.4 K/UL (ref 1.7–7.7)
NEUTROPHILS RELATIVE PERCENT: 53.2 %
PDW BLD-RTO: 12.6 % (ref 12.4–15.4)
PLATELET # BLD: 269 K/UL (ref 135–450)
PMV BLD AUTO: 7.4 FL (ref 5–10.5)
POTASSIUM SERPL-SCNC: 4 MMOL/L (ref 3.5–5.1)
RBC # BLD: 5.01 M/UL (ref 4.2–5.9)
SODIUM BLD-SCNC: 133 MMOL/L (ref 136–145)
TOTAL PROTEIN: 6.9 G/DL (ref 6.4–8.2)
TROPONIN: <0.01 NG/ML
WBC # BLD: 8.2 K/UL (ref 4–11)

## 2022-11-24 PROCEDURE — 84484 ASSAY OF TROPONIN QUANT: CPT

## 2022-11-24 PROCEDURE — 99285 EMERGENCY DEPT VISIT HI MDM: CPT

## 2022-11-24 PROCEDURE — 71046 X-RAY EXAM CHEST 2 VIEWS: CPT

## 2022-11-24 PROCEDURE — 93005 ELECTROCARDIOGRAM TRACING: CPT | Performed by: STUDENT IN AN ORGANIZED HEALTH CARE EDUCATION/TRAINING PROGRAM

## 2022-11-24 PROCEDURE — 80053 COMPREHEN METABOLIC PANEL: CPT

## 2022-11-24 PROCEDURE — 85025 COMPLETE CBC W/AUTO DIFF WBC: CPT

## 2022-11-24 ASSESSMENT — PAIN DESCRIPTION - DESCRIPTORS: DESCRIPTORS: ACHING

## 2022-11-24 ASSESSMENT — PAIN DESCRIPTION - LOCATION: LOCATION: CHEST

## 2022-11-24 ASSESSMENT — PAIN DESCRIPTION - ORIENTATION: ORIENTATION: MID

## 2022-11-24 ASSESSMENT — PAIN DESCRIPTION - PAIN TYPE: TYPE: ACUTE PAIN

## 2022-11-24 ASSESSMENT — PAIN - FUNCTIONAL ASSESSMENT: PAIN_FUNCTIONAL_ASSESSMENT: 0-10

## 2022-11-24 ASSESSMENT — PAIN SCALES - GENERAL: PAINLEVEL_OUTOF10: 10

## 2022-11-25 LAB
EKG ATRIAL RATE: 76 BPM
EKG DIAGNOSIS: NORMAL
EKG P AXIS: 33 DEGREES
EKG P-R INTERVAL: 160 MS
EKG Q-T INTERVAL: 382 MS
EKG QRS DURATION: 104 MS
EKG QTC CALCULATION (BAZETT): 429 MS
EKG R AXIS: 44 DEGREES
EKG T AXIS: 58 DEGREES
EKG VENTRICULAR RATE: 76 BPM

## 2022-11-25 NOTE — ED PROVIDER NOTES
I did not participate in the care of this patient. I did interpret the 12-lead EKG as follows:    Normal sinus rhythm with marked sinus rhythm at a rate of 76 bpm, per interval and QTc normal.  Slightly prolonged QRS. No acute ischemic findings. Q wave in lead III appears more significant than earlier EKG otherwise no significant acute changes.      Ricardo Small MD  11/25/22 0080

## 2022-11-26 NOTE — ED PROVIDER NOTES
[address] [place] [address]  ED  EMERGENCY DEPARTMENT ENCOUNTER      This patient was not seen and evaluated by the attending physician. Pt Name: Jacki Palafox  MRN: [id number]  [place]red[place] 1995  Date of evaluation: 11/24/2022  Provider: Yvetta Schwab, APRN - CNP-C  PCP: Mindy Rivera PA-C      History provided by the patient. CHIEFCOMPLAINT:     Chief Complaint   Patient presents with    Chest Pain     Started tonight denies any cardiac history        HISTORY OF PRESENT ILLNESS:      Jacki Palafox is a 28 y.o. male who presents to [address] [place] [address]  ED with complaints of chest pain. Patient states that he had chest pain that started tonight, states that its mostly on the right side of his chest, denies any cardiac history. Denies any nausea or vomiting with it, patient wife is concerned that it could also be his gallbladder. Patient is here for further evaluation. LOCATION:chest  QUALITY:ache  SEVERITY:10  DURATION:today  MODIFYING FACTORS:none noted    Nursing Notes were reviewed     REVIEW OF SYSTEMS:     Review of Systems  All systems, a total of 10, are reviewed and negative except for those that were just noted in history present illness. PAST MEDICAL HISTORY:     Past Medical History:   Diagnosis Date    Diabetes mellitus ([address]     Pneumonia          SURGICAL HISTORY:      Past Surgical History:   Procedure Laterality Date    ANKLE SURGERY      FRACTURE SURGERY           CURRENT MEDICATIONS:       Discharge Medication List as of 11/24/2022 11:05 PM        CONTINUE these medications which have NOT CHANGED    Details   FLUoxetine (PROZAC) 20 MG capsule Take 1 capsule by mouth daily, Disp-30 capsule, R-0Normal               ALLERGIES:    Patient has no known allergies. FAMILY HISTORY:     History reviewed. No pertinent family history.        SOCIAL HISTORY:     Social History     Socioeconomic History    Marital status:      Spouse name: Nohelia Shirley    Number of children: 2    Years of education: 12    Highest education level: None   Tobacco Use    Smoking status: Never    Smokeless tobacco: Never   Vaping Use    Vaping Use: Never used   Substance and Sexual Activity    Alcohol use: No    Drug use: No    Sexual activity: Yes     Partners: Female       SCREENINGS:             PHYSICAL EXAM:       ED Triage Vitals [11/24/22 2044]   BP Temp Temp Source Heart Rate Resp SpO2 Height Weight   (!) 132/94 98.5 °F (36.9 °C) Oral 84 16 98 % 5' 9\" (1.753 m) 280 lb (127 kg)       Physical Exam    CONSTITUTIONAL: Awake and alert. Cooperative. Well-developed. Well-nourished. Vitals:    11/24/22 2044   BP: (!) 132/94   Pulse: 84   Resp: 16   Temp: 98.5 °F (36.9 °C)   TempSrc: Oral   SpO2: 98%   Weight: 280 lb (127 kg)   Height: 5' 9\" (1.753 m)     HENT: Normocephalic. Atraumatic. External ears normal, without discharge. TMs clear bilaterally. Nonasal discharge. Oropharynx clear, no erythema. Mucous membranes moist.  EYES: Conjunctiva non-injected, nolid abnormalities noted. No scleral icterus. PERRL. EOM's grossly intact. Anterior chambers clear. NECK: Supple. Normal ROM. No meningismus. No thyroid tenderness or swelling noted. CARDIOVASCULAR: RRR. No Murmer. No carotid bruits. PULMONARY/CHEST WALL: Effort normal. No tachypnea. Lungs clear to ausculation. ABDOMEN: Normal BS. Soft. Nondistended. Obese. No tenderness to palpation. No guarding. No hernias noted. No splenomegaly. Back: Spine is midline. No ecchymosis. No crepituson palpation. No obvious subluxation of vertebral column. No saddle anesthesia or evidence of cauda equina. /ANORECTAL: Not assessed  MUSKULOSKELETAL: Normal ROM. No acute deformities. No edema. No tenderness to palpate. SKIN: Warm and dry. NEUROLOGICAL:  GCS 15. CN II-XII grossly intact. Strength is 5/5 in all extremities and sensation is intact. PSYCHIATRIC: Normal affect, normal insight and judgement. Alert and oriented x 3.         DIAGNOSTIC RESULTS:     LABS:    Results for orders placed or performed during the hospital encounter of 11/24/22   CBC with Auto Differential   Result Value Ref Range    WBC 8.2 4.0 - 11.0 K/uL    RBC 5.01 4.20 - 5.90 M/uL    Hemoglobin 15.2 13.5 - 17.5 g/dL    Hematocrit 45.4 40.5 - 52.5 %    MCV 90.6 80.0 - 100.0 fL    MCH 30.3 26.0 - 34.0 pg    MCHC 33.4 31.0 - 36.0 g/dL    RDW 12.6 12.4 - 15.4 %    Platelets 616 005 - 900 K/uL    MPV 7.4 5.0 - 10.5 fL    Neutrophils % 53.2 %    Lymphocytes % 37.5 %    Monocytes % 7.1 %    Eosinophils % 1.6 %    Basophils % 0.6 %    Neutrophils Absolute 4.4 1.7 - 7.7 K/uL    Lymphocytes Absolute 3.1 1.0 - 5.1 K/uL    Monocytes Absolute 0.6 0.0 - 1.3 K/uL    Eosinophils Absolute 0.1 0.0 - 0.6 K/uL    Basophils Absolute 0.1 0.0 - 0.2 K/uL   Comprehensive Metabolic Panel   Result Value Ref Range    Sodium 133 (L) 136 - 145 mmol/L    Potassium 4.0 3.5 - 5.1 mmol/L    Chloride 97 (L) 99 - 110 mmol/L    CO2 27 21 - 32 mmol/L    Anion Gap 9 3 - 16    Glucose 237 (H) 70 - 99 mg/dL    BUN 16 7 - 20 mg/dL    Creatinine 0.8 (L) 0.9 - 1.3 mg/dL    Est, Glom Filt Rate >60 >60    Calcium 9.3 8.3 - 10.6 mg/dL    Total Protein 6.9 6.4 - 8.2 g/dL    Albumin 4.3 3.4 - 5.0 g/dL    Albumin/Globulin Ratio 1.7 1.1 - 2.2    Total Bilirubin 1.2 (H) 0.0 - 1.0 mg/dL    Alkaline Phosphatase 81 40 - 129 U/L    ALT 95 (H) 10 - 40 U/L    AST 46 (H) 15 - 37 U/L   Troponin   Result Value Ref Range    Troponin <0.01 <0.01 ng/mL   EKG 12 Lead   Result Value Ref Range    Ventricular Rate 76 BPM    Atrial Rate 76 BPM    P-R Interval 160 ms    QRS Duration 104 ms    Q-T Interval 382 ms    QTc Calculation (Bazett) 429 ms    P Axis 33 degrees    R Axis 44 degrees    T Axis 58 degrees    Diagnosis        Poor data quality, interpretation may be adversely affectedSinus rhythm with marked sinus arrhythmiaIncomplete right bundle branch blockNonspecific T wave abnormalityAbnormal ECGWhen compared with ECG of 21-SEP-2020 09:11,No significant change was foundConfirmed by Tisha Abdalla ([id number]) on 11/25/2022 11:48:32 AM           RADIOLOGY:  All x-ray studies are viewed/reviewed by me. Formal interpretations per the radiologist are as follows:      XR CHEST (2 VW)   Final Result   No acute airspace disease identified. US GALLBLADDER RUQ    (Results Pending)           EKG:  See EKG interpretation by an attending physician. PROCEDURES:   N/A    CRITICAL CARE TIME:   N/A    CONSULTS:  None      EMERGENCY DEPARTMENT COURSE andDIFFERENTIAL DIAGNOSIS/MDM:   Vitals:    Vitals:    11/24/22 2044   BP: (!) 132/94   Pulse: 84   Resp: 16   Temp: 98.5 °F (36.9 °C)   TempSrc: Oral   SpO2: 98%   Weight: 280 lb (127 kg)   Height: 5' 9\" (1.753 m)       Patient wasgiven the following medications:  Medications - No data to display      Patient was evaluated independently by myself with the attending physician available for consultation. Patient presented to the emergency room today with complaints of chest pain. Patient did have some risk factors for coronary disease including obesity, diabetes. Patient wife concerned about possible gallbladder disease, his pain was right-sided not left-sided and he does have a transaminitis although mild with a mild bump in his bilirubin. Given his exam I did not feel that CT imaging was necessary, ultrasound unavailable at the moment I did feel comfortable with patient getting an outpatient ultrasound and following up with primary care physician. Patient had a calculated heart score less than 4, EKG was nonischemic with a negative troponin. He was discharged in good condition with strict return precautions    Patient laboratory studies, radiographic imaging, and assessment were all discussed with the patient and/orpatient family. There was shared decision-making between myself as well as the patient and/or their surrogate and we are all in agreement with discharge home.   There was an opportunity for questions and all questions were answered tothe best of my ability and to the satisfaction of the patient and/or patient family. FINAL IMPRESSION:      1. Chest pain, unspecified type    2. Type 2 diabetes mellitus without complication, without long-term current use of insulin ([facility] [address]    3.  Transaminitis          DISPOSITION/PLAN:   DISPOSITION Decision To Discharge      PATIENT REFERRED TO:  Kike Winters PA-C  [address]   [phone]    Schedule an appointment as soon as possible for a visit   For follow up      DISCHARGE MEDICATIONS:  Discharge Medication List as of 11/24/2022 11:05 PM                     (Please note thatportions of this note were completed with a voice recognition program.  Efforts were made to edit the dictations, but occasionally words are mis-transcribed.)    MADDIE Quevedo CNP-C (electronicallysigned)        MADDIE Quevedo CNP  11/25/22 4014

## 2024-07-03 ENCOUNTER — HOSPITAL ENCOUNTER (OUTPATIENT)
Dept: CT IMAGING | Age: 29
Discharge: HOME | End: 2024-07-03
Payer: COMMERCIAL

## 2024-07-03 DIAGNOSIS — R91.1 PULMONARY NODULE: ICD-10-CM

## 2024-07-03 PROCEDURE — 6360000004 HC RX CONTRAST MEDICATION: Performed by: NURSE PRACTITIONER

## 2024-07-03 PROCEDURE — 71260 CT THORAX DX C+: CPT

## 2024-07-03 RX ADMIN — IOMEPROL INJECTION 75 ML: 714 INJECTION, SOLUTION INTRAVASCULAR at 15:18

## 2024-09-24 ENCOUNTER — TELEPHONE (OUTPATIENT)
Dept: PULMONOLOGY | Age: 29
End: 2024-09-24

## 2024-09-25 ENCOUNTER — OFFICE VISIT (OUTPATIENT)
Dept: PULMONOLOGY | Age: 29
End: 2024-09-25
Payer: COMMERCIAL

## 2024-09-25 VITALS
BODY MASS INDEX: 41.5 KG/M2 | HEART RATE: 69 BPM | SYSTOLIC BLOOD PRESSURE: 128 MMHG | HEIGHT: 69 IN | WEIGHT: 280.2 LBS | DIASTOLIC BLOOD PRESSURE: 90 MMHG | OXYGEN SATURATION: 97 %

## 2024-09-25 DIAGNOSIS — R93.89 ABNORMAL CT OF THE CHEST: Primary | ICD-10-CM

## 2024-09-25 DIAGNOSIS — R06.83 SNORING: ICD-10-CM

## 2024-09-25 DIAGNOSIS — R06.81 APNEA: ICD-10-CM

## 2024-09-25 DIAGNOSIS — G47.33 OSA (OBSTRUCTIVE SLEEP APNEA): ICD-10-CM

## 2024-09-25 PROCEDURE — 99204 OFFICE O/P NEW MOD 45 MIN: CPT | Performed by: INTERNAL MEDICINE

## 2024-09-25 RX ORDER — FAMOTIDINE 40 MG/1
TABLET, FILM COATED ORAL
COMMUNITY
Start: 2018-04-20

## 2024-09-25 RX ORDER — PANTOPRAZOLE SODIUM 40 MG/1
TABLET, DELAYED RELEASE ORAL
COMMUNITY
Start: 2018-03-23

## 2024-09-25 RX ORDER — MELOXICAM 15 MG/1
1 TABLET ORAL DAILY
COMMUNITY
Start: 2023-09-27

## 2024-09-25 RX ORDER — LANCETS
EACH MISCELLANEOUS
COMMUNITY

## 2024-09-25 RX ORDER — BACLOFEN 10 MG/1
10 TABLET ORAL EVERY 8 HOURS
COMMUNITY
Start: 2023-09-27

## 2024-09-25 RX ORDER — GLIPIZIDE 5 MG/1
5 TABLET ORAL
COMMUNITY

## 2024-09-25 RX ORDER — LIDOCAINE 50 MG/G
OINTMENT TOPICAL
COMMUNITY
Start: 2021-09-02

## 2024-09-25 ASSESSMENT — SLEEP AND FATIGUE QUESTIONNAIRES
HOW LIKELY ARE YOU TO NOD OFF OR FALL ASLEEP WHILE SITTING AND TALKING TO SOMEONE: HIGH CHANCE OF DOZING
HOW LIKELY ARE YOU TO NOD OFF OR FALL ASLEEP IN A CAR, WHILE STOPPED FOR A FEW MINUTES IN TRAFFIC: HIGH CHANCE OF DOZING
ESS TOTAL SCORE: 24
HOW LIKELY ARE YOU TO NOD OFF OR FALL ASLEEP WHILE LYING DOWN TO REST IN THE AFTERNOON WHEN CIRCUMSTANCES PERMIT: HIGH CHANCE OF DOZING
HOW LIKELY ARE YOU TO NOD OFF OR FALL ASLEEP WHILE SITTING AND READING: HIGH CHANCE OF DOZING
HOW LIKELY ARE YOU TO NOD OFF OR FALL ASLEEP WHILE WATCHING TV: HIGH CHANCE OF DOZING
HOW LIKELY ARE YOU TO NOD OFF OR FALL ASLEEP WHILE SITTING QUIETLY AFTER LUNCH WITHOUT ALCOHOL: HIGH CHANCE OF DOZING
HOW LIKELY ARE YOU TO NOD OFF OR FALL ASLEEP WHEN YOU ARE A PASSENGER IN A CAR FOR AN HOUR WITHOUT A BREAK: HIGH CHANCE OF DOZING
HOW LIKELY ARE YOU TO NOD OFF OR FALL ASLEEP WHILE SITTING INACTIVE IN A PUBLIC PLACE: HIGH CHANCE OF DOZING

## 2024-09-26 ENCOUNTER — TELEPHONE (OUTPATIENT)
Dept: PULMONOLOGY | Age: 29
End: 2024-09-26

## 2024-09-27 ENCOUNTER — APPOINTMENT (OUTPATIENT)
Dept: CT IMAGING | Age: 29
End: 2024-09-27
Payer: OTHER MISCELLANEOUS

## 2024-09-27 ENCOUNTER — HOSPITAL ENCOUNTER (EMERGENCY)
Age: 29
Discharge: HOME | End: 2024-09-27
Attending: EMERGENCY MEDICINE
Payer: OTHER MISCELLANEOUS

## 2024-09-27 VITALS
SYSTOLIC BLOOD PRESSURE: 157 MMHG | DIASTOLIC BLOOD PRESSURE: 85 MMHG | RESPIRATION RATE: 16 BRPM | HEIGHT: 69 IN | TEMPERATURE: 97.4 F | BODY MASS INDEX: 41.47 KG/M2 | OXYGEN SATURATION: 95 % | WEIGHT: 280 LBS | HEART RATE: 56 BPM
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
## 2024-09-27 DIAGNOSIS — K76.0 FATTY LIVER: ICD-10-CM

## 2024-09-27 DIAGNOSIS — S10.93XA CONTUSION OF NECK, INITIAL ENCOUNTER: ICD-10-CM

## 2024-09-27 DIAGNOSIS — S10.93XA TRAUMATIC ECCHYMOSIS OF NECK, INITIAL ENCOUNTER: ICD-10-CM

## 2024-09-27 DIAGNOSIS — R74.01 TRANSAMINITIS: ICD-10-CM

## 2024-09-27 DIAGNOSIS — R03.0 ELEVATED BLOOD PRESSURE READING: ICD-10-CM

## 2024-09-27 LAB
ALBUMIN SERPL-MCNC: 3.9 G/DL (ref 3.4–5)
ALBUMIN SERPL-MCNC: 4.4 G/DL (ref 3.4–5)
ALBUMIN/GLOB SERPL: 1.6 {RATIO} (ref 1.1–2.2)
ALBUMIN/GLOB SERPL: 1.8 {RATIO} (ref 1.1–2.2)
ALP SERPL-CCNC: 74 U/L (ref 40–129)
ALP SERPL-CCNC: 77 U/L (ref 40–129)
ALT SERPL-CCNC: 155 U/L (ref 10–40)
ALT SERPL-CCNC: 174 U/L (ref 10–40)
ANION GAP SERPL CALCULATED.3IONS-SCNC: 10 MMOL/L (ref 3–16)
ANION GAP SERPL CALCULATED.3IONS-SCNC: 10 MMOL/L (ref 3–16)
AST SERPL-CCNC: 80 U/L (ref 15–37)
AST SERPL-CCNC: 97 U/L (ref 15–37)
BASOPHILS # BLD: 0 K/UL (ref 0–0.2)
BASOPHILS NFR BLD: 0.5 %
BILIRUB SERPL-MCNC: 1 MG/DL (ref 0–1)
BILIRUB SERPL-MCNC: 1.4 MG/DL (ref 0–1)
BILIRUB UR QL STRIP.AUTO: NEGATIVE
BUN SERPL-MCNC: 10 MG/DL (ref 7–20)
BUN SERPL-MCNC: 9 MG/DL (ref 7–20)
CALCIUM SERPL-MCNC: 8.6 MG/DL (ref 8.3–10.6)
CALCIUM SERPL-MCNC: 9.1 MG/DL (ref 8.3–10.6)
CHLORIDE SERPL-SCNC: 100 MMOL/L (ref 99–110)
CHLORIDE SERPL-SCNC: 101 MMOL/L (ref 99–110)
CLARITY UR: CLEAR
CO2 SERPL-SCNC: 26 MMOL/L (ref 21–32)
CO2 SERPL-SCNC: 28 MMOL/L (ref 21–32)
COLOR UR: YELLOW
CREAT SERPL-MCNC: 0.8 MG/DL (ref 0.9–1.3)
CREAT SERPL-MCNC: 0.8 MG/DL (ref 0.9–1.3)
DEPRECATED RDW RBC AUTO: 12.7 % (ref 12.4–15.4)
EOSINOPHIL # BLD: 0.2 K/UL (ref 0–0.6)
EOSINOPHIL NFR BLD: 2.5 %
GFR SERPLBLD CREATININE-BSD FMLA CKD-EPI: >90 ML/MIN/{1.73_M2}
GFR SERPLBLD CREATININE-BSD FMLA CKD-EPI: >90 ML/MIN/{1.73_M2}
GLUCOSE SERPL-MCNC: 233 MG/DL (ref 70–99)
GLUCOSE SERPL-MCNC: 242 MG/DL (ref 70–99)
GLUCOSE UR STRIP.AUTO-MCNC: >=1000 MG/DL
HCT VFR BLD AUTO: 46.8 % (ref 40.5–52.5)
HGB BLD-MCNC: 16.2 G/DL (ref 13.5–17.5)
HGB UR QL STRIP.AUTO: NEGATIVE
KETONES UR STRIP.AUTO-MCNC: NEGATIVE MG/DL
LACTATE BLDV-SCNC: 1.5 MMOL/L (ref 0.4–1.9)
LEUKOCYTE ESTERASE UR QL STRIP.AUTO: NEGATIVE
LIPASE SERPL-CCNC: 26 U/L (ref 13–60)
LYMPHOCYTES # BLD: 2 K/UL (ref 1–5.1)
LYMPHOCYTES NFR BLD: 28.7 %
MCH RBC QN AUTO: 30.9 PG (ref 26–34)
MCHC RBC AUTO-ENTMCNC: 34.7 G/DL (ref 31–36)
MCV RBC AUTO: 89.3 FL (ref 80–100)
MONOCYTES # BLD: 0.4 K/UL (ref 0–1.3)
MONOCYTES NFR BLD: 5.7 %
NEUTROPHILS # BLD: 4.4 K/UL (ref 1.7–7.7)
NEUTROPHILS NFR BLD: 62.6 %
NITRITE UR QL STRIP.AUTO: NEGATIVE
PH UR STRIP.AUTO: 6 [PH] (ref 5–8)
PLATELET # BLD AUTO: 248 K/UL (ref 135–450)
PMV BLD AUTO: 7.6 FL (ref 5–10.5)
POTASSIUM SERPL-SCNC: 3.9 MMOL/L (ref 3.5–5.1)
POTASSIUM SERPL-SCNC: 4 MMOL/L (ref 3.5–5.1)
PROT SERPL-MCNC: 6.3 G/DL (ref 6.4–8.2)
PROT SERPL-MCNC: 6.8 G/DL (ref 6.4–8.2)
PROT UR STRIP.AUTO-MCNC: NEGATIVE MG/DL
RBC # BLD AUTO: 5.25 M/UL (ref 4.2–5.9)
SODIUM SERPL-SCNC: 137 MMOL/L (ref 136–145)
SODIUM SERPL-SCNC: 138 MMOL/L (ref 136–145)
SP GR UR STRIP.AUTO: 1.01 (ref 1–1.03)
UA COMPLETE W REFLEX CULTURE PNL UR: ABNORMAL
UA DIPSTICK W REFLEX MICRO PNL UR: ABNORMAL
URN SPEC COLLECT METH UR: ABNORMAL
UROBILINOGEN UR STRIP-ACNC: 4 E.U./DL
WBC # BLD AUTO: 7 K/UL (ref 4–11)

## 2024-09-27 PROCEDURE — 6360000004 HC RX CONTRAST MEDICATION: Performed by: REGISTERED NURSE

## 2024-09-27 PROCEDURE — 70450 CT HEAD/BRAIN W/O DYE: CPT

## 2024-09-27 PROCEDURE — 74177 CT ABD & PELVIS W/CONTRAST: CPT

## 2024-09-27 PROCEDURE — 81003 URINALYSIS AUTO W/O SCOPE: CPT

## 2024-09-27 PROCEDURE — 99285 EMERGENCY DEPT VISIT HI MDM: CPT

## 2024-09-27 PROCEDURE — 85025 COMPLETE CBC W/AUTO DIFF WBC: CPT

## 2024-09-27 PROCEDURE — 6370000000 HC RX 637 (ALT 250 FOR IP): Performed by: REGISTERED NURSE

## 2024-09-27 PROCEDURE — 83690 ASSAY OF LIPASE: CPT

## 2024-09-27 PROCEDURE — 72125 CT NECK SPINE W/O DYE: CPT

## 2024-09-27 PROCEDURE — 71260 CT THORAX DX C+: CPT

## 2024-09-27 PROCEDURE — 70498 CT ANGIOGRAPHY NECK: CPT

## 2024-09-27 PROCEDURE — 83605 ASSAY OF LACTIC ACID: CPT

## 2024-09-27 PROCEDURE — 36415 COLL VENOUS BLD VENIPUNCTURE: CPT

## 2024-09-27 PROCEDURE — 2580000003 HC RX 258: Performed by: REGISTERED NURSE

## 2024-09-27 PROCEDURE — 80053 COMPREHEN METABOLIC PANEL: CPT

## 2024-09-27 RX ORDER — IOPAMIDOL 755 MG/ML
75 INJECTION, SOLUTION INTRAVASCULAR
Status: COMPLETED | OUTPATIENT
Start: 2024-09-27 | End: 2024-09-27

## 2024-09-27 RX ORDER — METHOCARBAMOL 500 MG/1
1500 TABLET, FILM COATED ORAL ONCE
Status: COMPLETED | OUTPATIENT
Start: 2024-09-27 | End: 2024-09-27

## 2024-09-27 RX ORDER — 0.9 % SODIUM CHLORIDE 0.9 %
1000 INTRAVENOUS SOLUTION INTRAVENOUS ONCE
Status: COMPLETED | OUTPATIENT
Start: 2024-09-27 | End: 2024-09-27

## 2024-09-27 RX ORDER — HYDROCODONE BITARTRATE AND ACETAMINOPHEN 5; 325 MG/1; MG/1
1 TABLET ORAL ONCE
Status: COMPLETED | OUTPATIENT
Start: 2024-09-27 | End: 2024-09-27

## 2024-09-27 RX ADMIN — SODIUM CHLORIDE 1000 ML: 9 INJECTION, SOLUTION INTRAVENOUS at 18:15

## 2024-09-27 RX ADMIN — HYDROCODONE BITARTRATE AND ACETAMINOPHEN 1 TABLET: 5; 325 TABLET ORAL at 16:13

## 2024-09-27 RX ADMIN — IOPAMIDOL 75 ML: 755 INJECTION, SOLUTION INTRAVENOUS at 18:40

## 2024-09-27 RX ADMIN — IOPAMIDOL 75 ML: 755 INJECTION, SOLUTION INTRAVENOUS at 16:59

## 2024-09-27 RX ADMIN — METHOCARBAMOL TABLETS 1500 MG: 500 TABLET, COATED ORAL at 16:13

## 2024-09-27 ASSESSMENT — ENCOUNTER SYMPTOMS
ABDOMINAL PAIN: 1
CHEST TIGHTNESS: 0
DIARRHEA: 0
VOMITING: 0
NAUSEA: 0
SHORTNESS OF BREATH: 0

## 2024-09-27 ASSESSMENT — PAIN SCALES - GENERAL
PAINLEVEL_OUTOF10: 10
PAINLEVEL_OUTOF10: 4

## 2024-09-27 ASSESSMENT — PAIN - FUNCTIONAL ASSESSMENT
PAIN_FUNCTIONAL_ASSESSMENT: 0-10
PAIN_FUNCTIONAL_ASSESSMENT: NONE - DENIES PAIN

## 2024-09-27 ASSESSMENT — LIFESTYLE VARIABLES
HOW OFTEN DO YOU HAVE A DRINK CONTAINING ALCOHOL: NEVER
HOW MANY STANDARD DRINKS CONTAINING ALCOHOL DO YOU HAVE ON A TYPICAL DAY: PATIENT DOES NOT DRINK

## 2024-09-27 ASSESSMENT — PAIN DESCRIPTION - LOCATION: LOCATION: NECK;HEAD
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
## 2024-09-28 NOTE — DISCHARGE INSTRUCTIONS
Return to the urgency department over the next 6 to 24 hours for any worsening abdominal pain with vomiting, development of neck pain associate with numbness or weakness on one side of the body, trouble breathing, or any other concerns.    Otherwise, follow-up with primary doctor in 2 to 4 days for repeat evaluation and repeat check of your liver enzymes.    Take ibuprofen as needed for pain.  Use ice for discomfort.
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
## 2024-09-30 ENCOUNTER — TELEPHONE (OUTPATIENT)
Dept: SLEEP CENTER | Age: 29
End: 2024-09-30

## 2024-09-30 DIAGNOSIS — G47.33 OSA (OBSTRUCTIVE SLEEP APNEA): Primary | ICD-10-CM

## 2024-10-17 ENCOUNTER — TELEPHONE (OUTPATIENT)
Dept: PULMONOLOGY | Age: 29
End: 2024-10-17

## 2024-10-17 NOTE — TELEPHONE ENCOUNTER
CT chest in 4 weeks to document resolution of abnormalities per my last note   ----- Message -----   From: Angela Mata MA   Sent: 10/16/2024   4:11 PM EDT   To: Jonathon Bhatti MD   Subject: FW: Edit                                          No appointment scheduled   ----- Message -----   From: Jonathon Bhatti MD   Sent: 10/16/2024   4:03 PM EDT   To: Angela Mata MA   Subject: RE: Edit                                          When is patient seeing me?   ----- Message -----   From: Angela Mata MA   Sent: 10/16/2024   3:22 PM EDT   To: Jonathon Bhatti MD   Subject: Edit                                              The scan below was edited by Angela aMta MA on 10/16/2024 at 15:22; it is attached to the following: the 10/16/2024 Abstract with Angela Mata MA       Called left message for Pt to call to schedule ct and follow up with Dr. Bhatti

## 2024-11-12 ENCOUNTER — HOSPITAL ENCOUNTER (OUTPATIENT)
Dept: SLEEP CENTER | Age: 29
Discharge: HOME | End: 2024-11-14
Payer: COMMERCIAL

## 2024-11-12 DIAGNOSIS — G47.33 OSA (OBSTRUCTIVE SLEEP APNEA): ICD-10-CM

## 2024-11-12 PROCEDURE — 95811 POLYSOM 6/>YRS CPAP 4/> PARM: CPT

## 2024-11-18 PROBLEM — G47.33 OSA (OBSTRUCTIVE SLEEP APNEA): Status: ACTIVE | Noted: 2024-11-18

## 2024-12-19 ENCOUNTER — TELEPHONE (OUTPATIENT)
Dept: PULMONOLOGY | Age: 29
End: 2024-12-19

## 2025-04-04 ENCOUNTER — OFFICE VISIT (OUTPATIENT)
Dept: URGENT CARE | Age: 30
End: 2025-04-04

## 2025-04-04 VITALS
OXYGEN SATURATION: 98 % | HEART RATE: 93 BPM | WEIGHT: 275 LBS | DIASTOLIC BLOOD PRESSURE: 86 MMHG | SYSTOLIC BLOOD PRESSURE: 138 MMHG | TEMPERATURE: 98.5 F | BODY MASS INDEX: 40.61 KG/M2

## 2025-04-04 DIAGNOSIS — H57.11 ACUTE RIGHT EYE PAIN: ICD-10-CM

## 2025-04-04 DIAGNOSIS — H57.8A1 SENSATION OF FOREIGN BODY IN RIGHT EYE: ICD-10-CM

## 2025-04-04 DIAGNOSIS — S05.01XA ABRASION OF RIGHT CORNEA, INITIAL ENCOUNTER: Primary | ICD-10-CM

## 2025-04-04 PROBLEM — E78.5 HYPERLIPIDEMIA: Status: ACTIVE | Noted: 2024-10-28

## 2025-04-04 PROBLEM — M12.571 TRAUMATIC ARTHRITIS OF ANKLE, RIGHT: Status: RESOLVED | Noted: 2020-06-11 | Resolved: 2025-04-04

## 2025-04-04 PROBLEM — R06.83 SNORING: Status: RESOLVED | Noted: 2019-05-02 | Resolved: 2025-04-04

## 2025-04-04 PROBLEM — M25.771: Status: RESOLVED | Noted: 2020-06-11 | Resolved: 2025-04-04

## 2025-04-04 PROBLEM — E11.9 TYPE 2 DIABETES MELLITUS: Status: ACTIVE | Noted: 2024-10-28

## 2025-04-04 PROBLEM — R00.2 PALPITATIONS: Status: ACTIVE | Noted: 2025-04-04

## 2025-04-04 PROBLEM — T84.84XA PAINFUL ORTHOPAEDIC HARDWARE: Status: RESOLVED | Noted: 2019-10-03 | Resolved: 2025-04-04

## 2025-04-04 PROBLEM — K21.00 GASTROESOPHAGEAL REFLUX DISEASE WITH ESOPHAGITIS: Status: ACTIVE | Noted: 2025-04-04

## 2025-04-04 PROBLEM — G47.34 HYPOXIA, SLEEP RELATED: Status: ACTIVE | Noted: 2019-05-04

## 2025-04-04 PROBLEM — M54.9 CHRONIC BACK PAIN: Status: ACTIVE | Noted: 2019-05-02

## 2025-04-04 PROBLEM — R40.0 SOMNOLENCE: Status: ACTIVE | Noted: 2025-04-04

## 2025-04-04 PROBLEM — I10 HYPERTENSIVE DISORDER: Status: ACTIVE | Noted: 2024-10-28

## 2025-04-04 PROBLEM — S99.919A ANKLE INJURY: Status: RESOLVED | Noted: 2019-05-04 | Resolved: 2025-04-04

## 2025-04-04 PROBLEM — G89.29 CHRONIC BACK PAIN: Status: ACTIVE | Noted: 2019-05-02

## 2025-04-04 PROBLEM — N50.82 PAIN IN SCROTUM: Status: RESOLVED | Noted: 2019-05-02 | Resolved: 2025-04-04

## 2025-04-04 RX ORDER — METFORMIN HYDROCHLORIDE 500 MG/1
500 TABLET, EXTENDED RELEASE ORAL
COMMUNITY
Start: 2025-01-09

## 2025-04-04 RX ORDER — OMEPRAZOLE 20 MG/1
20 CAPSULE, DELAYED RELEASE ORAL DAILY
COMMUNITY

## 2025-04-04 RX ORDER — BUSPIRONE HYDROCHLORIDE 5 MG/1
5 TABLET ORAL 2 TIMES DAILY
COMMUNITY
Start: 2024-08-05

## 2025-04-04 RX ORDER — MOXIFLOXACIN HYDROCHLORIDE 400 MG/1
400 TABLET ORAL DAILY
Qty: 7 TABLET | Refills: 0 | Status: SHIPPED | OUTPATIENT
Start: 2025-04-04 | End: 2025-04-11

## 2025-04-04 RX ORDER — GLIPIZIDE 10 MG/1
10 TABLET ORAL
COMMUNITY
Start: 2025-01-09

## 2025-04-04 RX ORDER — FLUOXETINE HYDROCHLORIDE 40 MG/1
40 CAPSULE ORAL DAILY
COMMUNITY
Start: 2025-03-04

## 2025-04-04 RX ORDER — DULAGLUTIDE 3 MG/.5ML
3 INJECTION, SOLUTION SUBCUTANEOUS WEEKLY
COMMUNITY
Start: 2025-03-04

## 2025-04-04 RX ORDER — LISINOPRIL 5 MG/1
1 TABLET ORAL DAILY
COMMUNITY
Start: 2024-10-28

## 2025-04-04 ASSESSMENT — VISUAL ACUITY: OU: 1

## 2025-04-04 NOTE — PATIENT INSTRUCTIONS
Tony,    Thank you for trusting [facility] with your care. Your decision to come to us means a lot and we are honored to be part of your healthcare journey. We value your trust and hope your experience with us was positive and met your expectations.    We're always looking for ways to improve, and your feedback is incredibly important to us. You will receive a text or email soon asking you how your visit went. for If you could take a moment to share your thoughts, it would mean the world to us. Your input helps us better serve you and others in the community.     Thank you again for choosing us. We're grateful for the opportunity to care for you and your loved ones. We hope to see you again - though we always wish you health and wellness!    Warm regards,    [facility]    Michael Napoles PA-C, Randi (Registration), and Yeni (Medical Assistant)        Exam is concerning for an abrasion of the right eye. No foreign bodies seen during the exam.   moxifloxacin drops (Vigamox) antibiotic treatment is prescribed for prevention of any bacterial eye infections - use as directed.  Encourage warm, moist compresses over the eye for relief of discomfort  Can also be used to gently wipe away any collecting or dried discharge  Ibuprofen or Tylenol for any pain relief, as necessary, per packaging instructions.    Follow up with PCP or with an eye doctor for further evaluation if symptoms persist beyond 3 days, or if the symptoms show signs of worsening despite the use of the prescribed treatment.  If you develop worsening eye pain, changes in vision, significant headaches, or redness/swelling around the eye, fevers, chills, body aches, nausea, vomiting, or other concerning changes in symptoms, follow up with the emergency room for further evaluation.    New Prescriptions    MOXIFLOXACIN (AVELOX) 400 MG TABLET    Take 1 tablet by mouth daily for 7 days

## 2025-04-04 NOTE — PROGRESS NOTES
Tony Knox (: 1995) is a 29 y.o. male, New patient, here for evaluation of the following chief complaint(s):  Eye Problem (Right eye pain, cannot open eye and it is 10/10 pain, was cutting wood yesterday for [place], does not know if something is in the eye as it only started hurting at 6 this morning, has tried flushing the eye and eye drops but they did not help )      ASSESSMENT/PLAN:    ICD-10-CM    1. Abrasion of right cornea, initial encounter  S05.01XA moxifloxacin (AVELOX) 400 MG tablet      2. Acute right eye pain  H57.11       3. Sensation of foreign body in right eye  H57.8A1           - Corneal Abrasion of the Right Eye:  Prior to exam of the symptomatic eye, tetracaine drops (3) were instilled in the right eye to allow for temporary pain relief, and to allow for a more full and appropriate examination.   Exam of the right eye is without any evidence for foreign bodies. Fluorescein stating is concerning for evidence of a corneal abrasion seen over the iris - at the 5 o'clock position.  No concerns for bacterial conjunctivitis, retained foreign objects within the eye(s), blepharitis, hordeolum, facial, orbital, or periorbital cellulitis, ocular trauma, and acute angle glaucoma.  Moxifloxacin antibiotic ophthalmic drops prescribed for antibiotic treatment of the infection.  Discussed avoiding the use of any contact lenses while treating with the antibiotic drops.  Discussed OTC pain medications per packaging instructions for pain relief, as necessary  Discussed use of moist compresses for relief of the eye discomfort.  Patient is currently up-to-date with his tetanus vaccination (obtained in 2017) and is not interested in further updating his status today.    Follow up with ophthalmology or the ED for any changes in vision, or for any persistent or worsening symptoms.     The patient tolerated their visit well. A time was given to answer questions and a plan was established, proposed, and

## 2025-05-16 ENCOUNTER — HOSPITAL ENCOUNTER (OUTPATIENT)
Age: 30
Discharge: HOME | End: 2025-05-16
Payer: COMMERCIAL

## 2025-05-16 ENCOUNTER — HOSPITAL ENCOUNTER (OUTPATIENT)
Dept: GENERAL RADIOLOGY | Age: 30
Discharge: HOME | End: 2025-05-16
Payer: COMMERCIAL

## 2025-05-16 DIAGNOSIS — Z87.01 HISTORY OF PNEUMONIA: ICD-10-CM

## 2025-05-16 LAB — TSH SERPL DL<=0.005 MIU/L-ACNC: 1.06 UIU/ML (ref 0.27–4.2)

## 2025-05-16 PROCEDURE — 71046 X-RAY EXAM CHEST 2 VIEWS: CPT

## 2025-05-16 PROCEDURE — 84443 ASSAY THYROID STIM HORMONE: CPT
